# Patient Record
Sex: FEMALE | Race: WHITE | NOT HISPANIC OR LATINO | URBAN - METROPOLITAN AREA
[De-identification: names, ages, dates, MRNs, and addresses within clinical notes are randomized per-mention and may not be internally consistent; named-entity substitution may affect disease eponyms.]

---

## 2019-02-16 ENCOUNTER — COMMUNICATION - RIVER FALLS (OUTPATIENT)
Dept: FAMILY MEDICINE | Facility: CLINIC | Age: 44
End: 2019-02-16

## 2019-02-16 ENCOUNTER — OFFICE VISIT - RIVER FALLS (OUTPATIENT)
Dept: FAMILY MEDICINE | Facility: CLINIC | Age: 44
End: 2019-02-16

## 2019-02-16 LAB — DEPRECATED S PYO AG THROAT QL EIA: NOT DETECTED

## 2019-02-18 LAB — BACTERIA SPEC CULT: NORMAL

## 2022-02-11 VITALS
TEMPERATURE: 98.7 F | OXYGEN SATURATION: 96 % | HEART RATE: 91 BPM | SYSTOLIC BLOOD PRESSURE: 132 MMHG | DIASTOLIC BLOOD PRESSURE: 84 MMHG

## 2022-02-16 NOTE — PROGRESS NOTES
Patient:   JACINDA HERNÁNDEZ            MRN: [id number]            FIN: [id number]               Age:   43 years     Sex:  Female     :  1975   Associated Diagnoses:   Sore throat   Author:   Marcelo Adair PA-C      Chief Complaint   2019 10:26 AM CST   Patient is here for possible strep. c/o severe sore throat, stiff neck, and fever/chills.        History of Present Illness   Chief complaint and symptoms noted above and confirmed with patient   as above, taking tylenol and ibuprofen      Review of Systems   Constitutional:  Fever, Chills.    Ear/Nose/Mouth/Throat:  Sore throat.    Respiratory:  No cough.       Health Status   Allergies:    Allergic Reactions (Selected)  Severity Not Documented  Sulfa drug (No reactions were documented)   Problem list:    No problem items selected or recorded.   Medications:  (Selected)   , not on any regular medications      Histories   Past Medical History:    No active or resolved past medical history items have been selected or recorded.   Family History:    No family history items have been selected or recorded.   Procedure history:    No active procedure history items have been selected or recorded.   Social History:        Alcohol Assessment: Current                     Comments:                      2011 - Cristine Mccauley MA                     will have a drink every now & then      Tobacco Assessment: Denies Tobacco Use      Substance Abuse Assessment: Denies Substance Abuse      Exercise and Physical Activity Assessment: Regular exercise      Physical Examination   Vital Signs   2019 10:26 AM CST Temperature Tympanic 98.7 DegF    Peripheral Pulse Rate 91 bpm    HR Method Electronic    Systolic Blood Pressure 132 mmHg  HI    Diastolic Blood Pressure 84 mmHg  HI    Mean Arterial Pressure 100 mmHg    BP Site Right arm    BP Method Manual    Oxygen Saturation 96 %      Measurements from flowsheet : Measurements   2019 10:26 AM CST   Ht/Wt  Measurement Refused by Patient?     Yes     General:  No acute distress.    HENT:  Tympanic membranes are clear, No sinus tenderness, oropharynx is moderately enlarged and inflamed without exudate, nares are patent.    Neck:  Supple, mild anterior cervical lymphadenopathy, slight tenderness.    Respiratory:  Lungs are clear to auscultation.       Review / Management   Results review:       Interpretation: rapid strep is negative; culture pending, will call if abnormal results..       Impression and Plan   Diagnosis     Sore throat ([id number] J02.9).     Summary:  Fluids, salt water gargles, popsicles,  throat lozenges, NSAIDS; follow up if not improving, will add 3 day course of prednisone for the sore throat.    Orders     Orders   Pharmacy:  predniSONE 20 mg oral tablet (Prescribe): = 2 tab(s) ( 40 mg ), PO, Daily, x 3 day(s), Instructions: with food or milk, # 6 tab(s), 0 Refill(s), Type: Maintenance, Pharmacy: [id number], 2 tab(s) Oral daily,x3 day(s),Instr:with food or milk.     Orders   Charges (Evaluation and Management):  21264 office outpatient visit 15 minutes (Charge) (Order): Quantity: 1, Sore throat.

## 2022-02-16 NOTE — TELEPHONE ENCOUNTER
---------------------From: Shahla JAMES Emely To: [employer] Message Pool ([address] [place] [address]);   Sent: 2/18/2019 10:24:13 AM CSTSubject: Strep culture PCP:   None, saw [facility]  Time of Call:  0943   Person Calling:  PatientPhone number:  [phone]:   Patient called looking for strep culture results as POC was negative 2/16/19Last office visit and reason:  2/16/19 Sore throat w/ DWGStrep Culture results are not back yet.  Jose Pelaez CMA---------------------From: Joann Strickland LPN ([employer] Message Pool ([address] [place] [address])) To: [employer] Message Pool ([address] [place] [address]);   Sent: 2/18/2019 3:06:50 PM CSTSubject: FW: Strep culture Patient called back and needs to know if she can go to work at the [facility] [employer].  She needs to know results are negative or she has to stay home.  Looks like results are back.  Please advise.---------------------From: Jose Pelaez CMA ([facility] Message Pool ([facility]24[facility])) To: Marcelo Adair PA-C;   Sent: 2/18/2019 3:32:08 PM CSTSubject: FW: Strep culture---------------------From: Marcelo Adair PA-C To: [employer] Pool ([address] [place] [address]);   Sent: 2/18/2019 3:48:07 PM CSTSubject: RE: Strep culture strep culture was negative.  If she is not running a fever she can go to workI called pt and gave her [employer] message.  Jose Pelaez CMA